# Patient Record
Sex: MALE | Race: WHITE
[De-identification: names, ages, dates, MRNs, and addresses within clinical notes are randomized per-mention and may not be internally consistent; named-entity substitution may affect disease eponyms.]

---

## 2018-07-25 ENCOUNTER — HOSPITAL ENCOUNTER (EMERGENCY)
Dept: HOSPITAL 53 - M ED | Age: 58
Discharge: HOME | End: 2018-07-25
Payer: MEDICARE

## 2018-07-25 DIAGNOSIS — R09.89: Primary | ICD-10-CM

## 2018-07-25 DIAGNOSIS — Z79.899: ICD-10-CM

## 2018-07-25 DIAGNOSIS — E11.9: ICD-10-CM

## 2018-07-25 DIAGNOSIS — Z88.5: ICD-10-CM

## 2018-07-25 DIAGNOSIS — I10: ICD-10-CM

## 2018-07-25 DIAGNOSIS — Z91.048: ICD-10-CM

## 2018-07-25 DIAGNOSIS — Z88.1: ICD-10-CM

## 2018-07-25 DIAGNOSIS — I70.203: ICD-10-CM

## 2018-07-25 DIAGNOSIS — K21.9: ICD-10-CM

## 2018-07-25 DIAGNOSIS — Z79.4: ICD-10-CM

## 2018-07-25 DIAGNOSIS — Z98.0: ICD-10-CM

## 2018-07-25 DIAGNOSIS — Z88.8: ICD-10-CM

## 2018-07-25 PROCEDURE — 93925 LOWER EXTREMITY STUDY: CPT

## 2018-07-27 ENCOUNTER — HOSPITAL ENCOUNTER (OUTPATIENT)
Dept: HOSPITAL 53 - M LAB REF | Age: 58
End: 2018-07-27
Attending: SURGERY
Payer: MEDICARE

## 2018-07-27 DIAGNOSIS — E11.621: Primary | ICD-10-CM

## 2018-07-27 PROCEDURE — 87186 SC STD MICRODIL/AGAR DIL: CPT

## 2018-08-03 ENCOUNTER — HOSPITAL ENCOUNTER (EMERGENCY)
Dept: HOSPITAL 53 - M ED | Age: 58
LOS: 1 days | Discharge: LEFT BEFORE DISCHARGE | End: 2018-08-04
Payer: MEDICARE

## 2018-08-03 DIAGNOSIS — Z88.5: ICD-10-CM

## 2018-08-03 DIAGNOSIS — M86.172: ICD-10-CM

## 2018-08-03 DIAGNOSIS — Z88.8: ICD-10-CM
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
## 2018-08-03 DIAGNOSIS — Z88.0: ICD-10-CM

## 2018-08-03 DIAGNOSIS — Z87.891: ICD-10-CM

## 2018-08-03 DIAGNOSIS — Z88.1: ICD-10-CM

## 2018-08-03 DIAGNOSIS — I10: ICD-10-CM

## 2018-08-03 DIAGNOSIS — Z79.899: ICD-10-CM

## 2018-08-03 DIAGNOSIS — Z91.048: ICD-10-CM

## 2018-08-03 DIAGNOSIS — Z79.84: ICD-10-CM

## 2018-08-03 DIAGNOSIS — S91.302A: Primary | ICD-10-CM
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
## 2018-08-03 DIAGNOSIS — K21.9: ICD-10-CM

## 2018-08-03 DIAGNOSIS — M54.9: ICD-10-CM

## 2018-08-03 DIAGNOSIS — L97.529: ICD-10-CM

## 2018-08-03 DIAGNOSIS — E11.621: ICD-10-CM

## 2018-08-03 DIAGNOSIS — Z79.4: ICD-10-CM

## 2018-08-03 LAB
ANION GAP: 7 MEQ/L (ref 8–16)
BASO #: 0.1 10^3/UL (ref 0–0.2)
BASO %: 0.3 % (ref 0–1)
BLOOD UREA NITROGEN: 21 MG/DL (ref 7–18)
CALCIUM LEVEL: 9 MG/DL (ref 8.5–10.1)
CARBON DIOXIDE LEVEL: 32 MEQ/L (ref 21–32)
CHLORIDE LEVEL: 91 MEQ/L (ref 98–107)
CREATININE FOR GFR: 1.23 MG/DL (ref 0.7–1.3)
CRP SERPL-MCNC: 20.7 MG/DL (ref 0–0.3)
EOS #: 0.1 10^3/UL (ref 0–0.5)
EOSINOPHIL NFR BLD AUTO: 0.3 % (ref 0–3)
ERYTHROCYTE SEDIMENTATION RATE: 61 MM/HR (ref 0–20)
GFR SERPL CREATININE-BSD FRML MDRD: > 60 ML/MIN/{1.73_M2} (ref 56–?)
GLUCOSE BLDC GLUCOMTR-MCNC: 220 MG/DL (ref 70–105)
GLUCOSE, FASTING: 265 MG/DL (ref 70–100)
HEMATOCRIT: 36.1 % (ref 42–52)
HEMOGLOBIN: 12 G/DL (ref 13.5–17.5)
IMMATURE GRANULOCYTE %: 0.6 % (ref 0–3)
LACTIC ACID SEPSIS PROTOCOL: 1.9 MMOL/L (ref 0.4–2)
LYMPH #: 1.7 10^3/UL (ref 1.5–4.5)
LYMPH %: 8.6 % (ref 24–44)
MEAN CORPUSCULAR HEMOGLOBIN: 28.8 PG (ref 27–33)
MEAN CORPUSCULAR HGB CONC: 33.2 G/DL (ref 32–36.5)
MEAN CORPUSCULAR VOLUME: 86.8 FL (ref 80–96)
MONO #: 1.6 10^3/UL (ref 0–0.8)
MONO %: 8.4 % (ref 0–5)
NEUTROPHILS #: 15.8 10^3/UL (ref 1.8–7.7)
NEUTROPHILS %: 81.8 % (ref 36–66)
NRBC BLD AUTO-RTO: 0 % (ref 0–0)
PLATELET COUNT, AUTOMATED: 355 10^3/UL (ref 150–450)
POTASSIUM SERUM: 3.8 MEQ/L (ref 3.5–5.1)
RED BLOOD COUNT: 4.16 10^6/UL (ref 4.3–6.1)
RED CELL DISTRIBUTION WIDTH: 13.4 % (ref 11.5–14.5)
SODIUM LEVEL: 130 MEQ/L (ref 136–145)
WHITE BLOOD COUNT: 19.3 10^3/UL (ref 4–10)

## 2018-08-03 RX ADMIN — DEXTROSE MONOHYDRATE 1 MLS/HR: 50 INJECTION, SOLUTION INTRAVENOUS at 21:02

## 2018-08-03 RX ADMIN — ACETAMINOPHEN 1 MG: 325 TABLET ORAL at 19:51

## 2018-08-03 RX ADMIN — MORPHINE SULFATE 1 MG: 4 INJECTION INTRAVENOUS at 19:00

## 2018-08-03 RX ADMIN — DEXTROSE MONOHYDRATE 1 MLS/HR: 50 INJECTION, SOLUTION INTRAVENOUS at 22:22

## 2018-08-03 RX ADMIN — MORPHINE SULFATE 1 MG: 2 INJECTION, SOLUTION INTRAMUSCULAR; INTRAVENOUS at 23:20

## 2018-08-28 ENCOUNTER — HOSPITAL ENCOUNTER (OUTPATIENT)
Dept: HOSPITAL 53 - M LAB REF | Age: 58
End: 2018-08-28
Attending: SURGERY
Payer: MEDICARE
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
## 2018-08-28 DIAGNOSIS — S98.132A: Primary | ICD-10-CM
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
## 2018-08-28 PROCEDURE — 87493 C DIFF AMPLIFIED PROBE: CPT

## 2018-08-29 ENCOUNTER — HOSPITAL ENCOUNTER (OUTPATIENT)
Dept: HOSPITAL 53 - M LAB REF | Age: 58
End: 2018-08-29
Attending: SURGERY
Payer: MEDICARE

## 2018-08-29 DIAGNOSIS — S98.132A: Primary | ICD-10-CM

## 2018-08-29 DIAGNOSIS — Z98.84: ICD-10-CM

## 2018-08-29 DIAGNOSIS — E11.40: ICD-10-CM
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
## 2018-08-29 LAB
ERYTHROCYTE SEDIMENTATION RATE: 72 MM/HR (ref 0–20)
EST. AVERAGE GLUCOSE BLD GHB EST-MCNC: 203 MG/DL (ref 60–110)
HEMATOCRIT: 38.4 % (ref 42–52)
HEMOGLOBIN: 12.4 G/DL (ref 13.5–17.5)
MEAN CORPUSCULAR HEMOGLOBIN: 28.2 PG (ref 27–33)
MEAN CORPUSCULAR HGB CONC: 32.3 G/DL (ref 32–36.5)
MEAN CORPUSCULAR VOLUME: 87.5 FL (ref 80–96)
NRBC BLD AUTO-RTO: 0 % (ref 0–0)
PLATELET COUNT, AUTOMATED: 338 10^3/UL (ref 150–450)
RED BLOOD COUNT: 4.39 10^6/UL (ref 4.3–6.1)
RED CELL DISTRIBUTION WIDTH: 14.1 % (ref 11.5–14.5)
WHITE BLOOD COUNT: 10.3 10^3/UL (ref 4–10)

## 2018-08-29 PROCEDURE — 83036 HEMOGLOBIN GLYCOSYLATED A1C: CPT

## 2019-09-09 ENCOUNTER — HOSPITAL ENCOUNTER (OUTPATIENT)
Dept: HOSPITAL 53 - M RAD | Age: 59
End: 2019-09-09
Attending: SURGERY
Payer: COMMERCIAL

## 2019-09-09 DIAGNOSIS — I65.23: Primary | ICD-10-CM

## 2019-09-09 DIAGNOSIS — I70.244: ICD-10-CM

## 2019-09-09 DIAGNOSIS — L97.529: ICD-10-CM

## 2019-09-09 DIAGNOSIS — I70.201: ICD-10-CM

## 2019-09-09 NOTE — REP
CAROTID ULTRASOUND:

 

Real-time ultrasound evaluation and duplex Doppler interrogation of the

extracranial carotid vasculature is performed.  There is mild plaquing and

narrowing in both carotid bulbs extending into the internal and external carotid

arteries.  Luminal narrowing is less than 50%.  There is no evidence of

hemodynamically significant stenosis of either internal carotid artery.  Normal

flow velocities are seen. The vertebral arteries demonstrate normal direction of

flow.

 

                                                                RIGHT

LEFT

 

Peak systolic velocity ICA                   64.5 cm/s                      70.2

cm/s

 

End diastolic velocity ICA                  23.3 cm/s                      14.8

cm/s

 

Peak systolic velocity CCA                  63.5 cm/s                     73.5

cm/s

 

Peak systolic velocity ECA                  105 cm/s                     102

cm/s

 

ICA/CCA ratio                              1.02

0.96

 

IMPRESSION:

 

Bilateral luminal narrowing of the internal carotid arteries less than 50%.  No

evidence of hemodynamically significant stenosis.

 

 

Electronically Signed by

Ricardo Riley MD 09/09/2019 02:47 P

## 2019-09-10 NOTE — REP
BILATERAL LOWER EXTREMITY DUPLEX DOPPLER ARTERIAL ULTRASOUND:

 

Real-time sonographic evaluation and duplex Doppler interrogation of bilateral

lower extremity arterial systems is performed and compared to the prior study of

07/25/2018.

 

On the right side, there is again noted occlusion of the proximal posterior

tibial artery with reconstitution distally and reversal of flow.  Diffuse

biphasic waveforms are seen except for monophasic reversal of waveform in the

distal right PTA.

 

On the left, mild phasic waveforms are seen diffuse except for biphasic waveform

in the proximal left superficial femoral artery.  These waveforms were previously

triphasic.  This suggests a more proximal stenosis in the left iliac arterial

system.  There is again occlusion of the proximal left posterior tibial artery

with reconstitution of the distal artery and reversal of flow.

 

                                         Right Peak                       Left

Peak

 

                                     Systolic Velocity              Systolic

velocity

 

Common femoral artery       93.0 cm/s                       91.0 cm/s

 

Profunda                              68.0 cm/s                       85.0 cm/s

 

Proximal SFA                       81.0 cm/s                      98.0  cm/s

 

Mid SFA                               90.0 cm/s                       14.0 cm/s

 

Distal SFA                            50.0 cm/s                     103.0 cm/s

 

Popliteal                               45.0 cm/s                     109.0 cm/s

 

Proximal SAVANNA                       54.0 cm/s                       73.0 cm/s

 

Tibial peroneal trunk            50.0 cm/s                     116.0 cm/s

 

Proximal PTA                       occluded                       occluded

 

Distal PTA                            reversed 11.0 cm/s       reversed 25 cm/s

 

Distal SAVANNA                            67.0 cm/s                       132.0 cm/s

 

IMPRESSION:

 

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  New

monophasic waveforms are seen diffusely throughout the left lower extremity

arterial system suggesting a more proximal stenosis of the left iliac arterial

system.

 

 

Electronically Signed by

Ricardo Riley MD 09/11/2019 10:11 A

## 2019-09-16 ENCOUNTER — HOSPITAL ENCOUNTER (OUTPATIENT)
Dept: HOSPITAL 53 - M LAB | Age: 59
End: 2019-09-16
Attending: SURGERY
Payer: MEDICARE

## 2019-09-16 DIAGNOSIS — L97.402: Primary | ICD-10-CM

## 2019-09-16 DIAGNOSIS — I70.213: ICD-10-CM

## 2019-09-16 DIAGNOSIS — E11.621: ICD-10-CM

## 2019-09-16 LAB
BASOPHILS # BLD AUTO: 0.1 10^3/UL (ref 0–0.2)
BASOPHILS NFR BLD AUTO: 0.7 % (ref 0–1)
BUN SERPL-MCNC: 26 MG/DL (ref 7–18)
CALCIUM SERPL-MCNC: 9.4 MG/DL (ref 8.8–10.2)
CHLORIDE SERPL-SCNC: 98 MEQ/L (ref 98–107)
CO2 SERPL-SCNC: 32 MEQ/L (ref 21–32)
CREAT SERPL-MCNC: 1.12 MG/DL (ref 0.7–1.3)
EOSINOPHIL # BLD AUTO: 0.4 10^3/UL (ref 0–0.5)
EOSINOPHIL NFR BLD AUTO: 3.9 % (ref 0–3)
GFR SERPL CREATININE-BSD FRML MDRD: > 60 ML/MIN/{1.73_M2} (ref 49–?)
GLUCOSE SERPL-MCNC: 95 MG/DL (ref 70–100)
HCT VFR BLD AUTO: 42.7 % (ref 42–52)
HGB BLD-MCNC: 13.9 G/DL (ref 13.5–17.5)
LYMPHOCYTES # BLD AUTO: 2.7 10^3/UL (ref 1.5–5)
LYMPHOCYTES NFR BLD AUTO: 24.4 % (ref 24–44)
MCH RBC QN AUTO: 29 PG (ref 27–33)
MCHC RBC AUTO-ENTMCNC: 32.6 G/DL (ref 32–36.5)
MCV RBC AUTO: 89 FL (ref 80–96)
MONOCYTES # BLD AUTO: 1 10^3/UL (ref 0–0.8)
MONOCYTES NFR BLD AUTO: 9.4 % (ref 0–5)
NEUTROPHILS # BLD AUTO: 6.7 10^3/UL (ref 1.5–8.5)
NEUTROPHILS NFR BLD AUTO: 61.1 % (ref 36–66)
PLATELET # BLD AUTO: 290 10^3/UL (ref 150–450)
POTASSIUM SERPL-SCNC: 4.3 MEQ/L (ref 3.5–5.1)
RBC # BLD AUTO: 4.8 10^6/UL (ref 4.3–6.1)
SODIUM SERPL-SCNC: 138 MEQ/L (ref 136–145)
WBC # BLD AUTO: 10.9 10^3/UL (ref 4–10)

## 2019-09-23 ENCOUNTER — HOSPITAL ENCOUNTER (OUTPATIENT)
Dept: HOSPITAL 53 - M IRPRO | Age: 59
End: 2019-09-23
Attending: SURGERY
Payer: MEDICARE

## 2019-09-23 VITALS — DIASTOLIC BLOOD PRESSURE: 65 MMHG | SYSTOLIC BLOOD PRESSURE: 150 MMHG

## 2019-09-23 DIAGNOSIS — I73.9: Primary | ICD-10-CM

## 2019-09-23 PROCEDURE — 75710 ARTERY X-RAYS ARM/LEG: CPT

## 2019-09-23 PROCEDURE — 36247 INS CATH ABD/L-EXT ART 3RD: CPT

## 2019-09-26 NOTE — ROOPDOC
[facility] Report Of Operation


Report of Operation


DATE OF PROCEDURE: 





PREOPERATIVE DIAGNOSES: 





POSTOPERATIVE DIAGNOSES: 





PROCEDURE: PROCEDURE: 


Abdominal aortogram


Pelvic aortogram and bilateral iliofemoral angiogram  


Selective left common femoral artery catheter placement with left lower 

extremity angiogram.


Selective left superficial femoral artery catheter placement with left lower 

extremity angiogram.


Right common femoral arteriotomy closure with a Mynx closure device.





SURGEON: Dr. ADAN Flores M.D.





ASSISTANT: 





INDICATION: Patient is a .  Patient was evaluated and recommendation was to un

dergo an angiogram with possible angioplasty, stent and/or arthrectomy.


The procedure was described and explained in detail to the patient including 

drawing of pictures demonstrating the procedure and pertinent anatomy.  Risks, 

benefits and alternative treatment options were discussed with the patient.  

Alternative treatment options included but were not limited to no intervention. 

Benefits include but were not limited to evaluation of arterial inflow to the  

lower extremities with possible intervention improving blood flow to the lower 

extremities.  Risks included but were not limited to infection, bleeding, renal 

failure requiring hemodialysis, retroperitoneal hematoma,  possible need for 

surgical intervention, possible requirement for transfusion of blood products, 

contrast dye reaction, allergic reaction and/or complication from the prepping 

and draping materials, sedation related complication,  scarring of the skin, 

bruising, nerve injury, anesthetic complications, cerebrovascular accident, 

myocardial infarction, pulmonary embolus, deep venous thrombosis, loss of limb, 

loss of life, poor satisfaction and poor outcome. Risks of not performing the 

procedure included but were not limited to worsening of current symptoms, 

worsening of atherosclerotic arterial occlusive disease resulting in possible 

limb loss and death. Patient's questions were answered.  Patient voices 

understanding of these risks, benefits and alternative treatment options.  

Patient voices acceptance of the risks associated with angiography with possible

angioplasty, stent and/or atherectomy and agrees to proceed with the procedure 

excepting the associated risks of the procedure. No guarantees or promises were 

made to the patient or his family regarding the results or outcome of the 

procedure.





ANESTHESIA: Local  mL of 2% lidocaine mixed with 0.5% Marcaine.





SEDATION TIME:   [The sedation was administered by . The cardiopulmonary 

monitoring during sedation was performed by .


Administration of sedation and  cardiopulmonary monitoring were performed under 

my direct supervision and direction.  I was present for and directed the entire 

case.  There were no sedation related complications. Patient was stable post 

sedation and returned to pre-sedation status.





FLUORO  TIME:  minutes





CONTRAST:  mL of Isovue 300





IVF:  mL





ESTIMATED BLOOD LOSS: mL. 





HEPARIN: 





PROTAMINE: [None]





COMPLICATIONS: None 





DRAINS: None





SPECIMENS: None





IMPLANTS: Right common femoral arteriotomy closure with a [5] Mongolian minx 

closure device








PROCEDURE: The patient was taken to the angiography suite, placed supine on the 

angiography room table and prepped and draped in a standard surgical fashion.  A

 procedural time out was performed by myself and the members of the team in the 

procedure room confirming the correct procedure, patient and laterality.  The 

right common femoral artery was then cannulated with a micropuncture needle 

after anesthetizing the overlying skin and subcutaneous tissue with 2% 

lidocaine.  The micropuncture wire was advanced through the micropuncture needle

 which was upsized to a micropuncture sheath.  The Alicia wire was then advanced

 through the micropuncture sheath which was upsized to a 5 Mongolian sheath.  The 

Omni flush catheter was advanced over the Alicia wire placed in the aorta at the

 level of the diaphragm and an abdominal aortogram was performed.  The catheter 

was pulled down in the aorta to above the bifurcation of the iliac arteries and 

the pelvic aortogram and bilateral iliofemoral angiogram was performed.  The 

catheter was then directed over the bifurcation of the iliac arteries using the 

Alicia wire and placed in the left common femoral artery selectively and a left 

lower extremity angiogram was performed. This demonstrated . [The catheter was 

then advanced into the right superficial femoral artery selectively as far 

distal as possible, using the Alicia wire, and a selective left lower extremity 

selective superficial femoral artery angiogram was performed.]  This 

demonstrated .  [The catheter and wire were then advanced into the popliteal 

artery and a left selective popliteal artery angiogram was performed.]    The  

arteriotomy in the right common femoral artery was closed using a [5] Mongolian 

Mynx closure device with an additional 10 min. of adjunctive pressure applied 

for hemostasis, which was noted.  Dressings were then applied. Patient tolerated

 the procedure well. There were no complications.  Dr. Flores was present for and

 directed the entire case.  The patient was transferred to the holding area . 








RADIOLOGIC SUPERVISION AND INTERPRETATION:


Abdominal aortogram: [The aorta was widely patent with good filling of lumbar 

vessels.  The superior mesenteric and celiac artery were widely patent with no 

stenosis noted.]  





Pelvic aortogram and bilateral iliofemoral angiogram: 





Selective left common femoral artery angiogram: The selective left common 

femoral artery catheter angiogram showed the left common femoral artery to be 

patent .





Selective left superficial femoral artery  angiogram: The selective left 

superficial femoral artery angiogram showed .





Intervention: [None]





A [5] Mongolian Mynx closure device was used to close the arteriotomy in the right 

common femoral artery.











CONCLUSION:











Germain Flores MD              Sep 26, 2019 21:30